# Patient Record
Sex: FEMALE | Race: ASIAN | NOT HISPANIC OR LATINO
[De-identification: names, ages, dates, MRNs, and addresses within clinical notes are randomized per-mention and may not be internally consistent; named-entity substitution may affect disease eponyms.]

---

## 2022-07-07 ENCOUNTER — NON-APPOINTMENT (OUTPATIENT)
Age: 62
End: 2022-07-07

## 2022-07-12 ENCOUNTER — NON-APPOINTMENT (OUTPATIENT)
Age: 62
End: 2022-07-12

## 2022-07-13 PROBLEM — Z00.00 ENCOUNTER FOR PREVENTIVE HEALTH EXAMINATION: Status: ACTIVE | Noted: 2022-07-13

## 2022-07-19 ENCOUNTER — APPOINTMENT (OUTPATIENT)
Dept: DERMATOLOGY | Facility: CLINIC | Age: 62
End: 2022-07-19

## 2022-07-19 PROCEDURE — 99205 OFFICE O/P NEW HI 60 MIN: CPT

## 2022-07-20 NOTE — HISTORY OF PRESENT ILLNESS
[FreeTextEntry1] : Mohs Surgery Consultation for SCCis on the lower lip [de-identified] : 07/19/2022\par \par Referred by Dr. Najera; Biopsying Physician: Dr. 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 The lesion is asx and has been present for 2.5 months. The lesion bleeds frequently.\par Since biopsy it has healed well with a thin white plaque. \par She grew up in [place] and denies a history of extensive sun exposure and has not had any history of sunburns that she can recall. \par She does not have other similar lesions in her mouth or on her wrists or anywhere else that she identifies. \par \par Skin Cancer History: none prior\par \par SH: Works as retired (was a ); she resigned from her job and placed her co-op on the market as she plans to move to [place] with her  (where he currently lives); she lives in [place] and takes public transportation. \par Upcoming travel plans: none\par Has [employer] insurance as her  is a former marine. \par \par Allergies: no\par Smoking: never\par \par Pertinent positives noted below \par History of HIV or hepatitis: No\par Blood thinners: no\par Antibiotic Prophylaxis: None \par Medical implants: None\par \par The patient's review of systems questionnaire was reviewed. Education needs were identified. There were no barriers to learning.

## 2022-07-20 NOTE — PHYSICAL EXAM
[Alert] : alert [Oriented x 3] : ~L oriented x 3 [Well Nourished] : well nourished [Conjunctiva Non-injected] : conjunctiva non-injected [No Visual Lymphadenopathy] : no visual  lymphadenopathy [No Clubbing] : no clubbing [No Edema] : no edema [No Bromhidrosis] : no bromhidrosis [No Chromhidrosis] : no chromhidrosis [FreeTextEntry3] : 1.4 x 0.6 cm faint white thin plaque on the lower lip centrally

## 2022-08-08 ENCOUNTER — APPOINTMENT (OUTPATIENT)
Dept: DERMATOLOGY | Facility: CLINIC | Age: 62
End: 2022-08-08

## 2022-08-08 PROCEDURE — 99213 OFFICE O/P EST LOW 20 MIN: CPT

## 2022-09-19 ENCOUNTER — APPOINTMENT (OUTPATIENT)
Dept: DERMATOLOGY | Facility: CLINIC | Age: 62
End: 2022-09-19

## 2022-09-19 DIAGNOSIS — C44.02 SQUAMOUS CELL CARCINOMA OF SKIN OF LIP: ICD-10-CM

## 2022-09-19 PROCEDURE — 99213 OFFICE O/P EST LOW 20 MIN: CPT
